# Patient Record
Sex: FEMALE | Race: ASIAN | NOT HISPANIC OR LATINO
[De-identification: names, ages, dates, MRNs, and addresses within clinical notes are randomized per-mention and may not be internally consistent; named-entity substitution may affect disease eponyms.]

---

## 2020-08-18 PROBLEM — Z00.00 ENCOUNTER FOR PREVENTIVE HEALTH EXAMINATION: Status: ACTIVE | Noted: 2020-08-18

## 2020-08-19 ENCOUNTER — APPOINTMENT (OUTPATIENT)
Dept: OTOLARYNGOLOGY | Facility: CLINIC | Age: 65
End: 2020-08-19
Payer: COMMERCIAL

## 2020-08-19 VITALS — BODY MASS INDEX: 25.61 KG/M2 | WEIGHT: 150 LBS | HEIGHT: 64 IN

## 2020-08-19 VITALS
OXYGEN SATURATION: 98 % | TEMPERATURE: 98.1 F | SYSTOLIC BLOOD PRESSURE: 137 MMHG | DIASTOLIC BLOOD PRESSURE: 85 MMHG | HEART RATE: 65 BPM

## 2020-08-19 DIAGNOSIS — Z82.49 FAMILY HISTORY OF ISCHEMIC HEART DISEASE AND OTHER DISEASES OF THE CIRCULATORY SYSTEM: ICD-10-CM

## 2020-08-19 DIAGNOSIS — H66.90 OTITIS MEDIA, UNSPECIFIED, UNSPECIFIED EAR: ICD-10-CM

## 2020-08-19 DIAGNOSIS — Z87.09 PERSONAL HISTORY OF OTHER DISEASES OF THE RESPIRATORY SYSTEM: ICD-10-CM

## 2020-08-19 DIAGNOSIS — Z86.79 PERSONAL HISTORY OF OTHER DISEASES OF THE CIRCULATORY SYSTEM: ICD-10-CM

## 2020-08-19 DIAGNOSIS — Z80.9 FAMILY HISTORY OF MALIGNANT NEOPLASM, UNSPECIFIED: ICD-10-CM

## 2020-08-19 DIAGNOSIS — Z82.2 FAMILY HISTORY OF DEAFNESS AND HEARING LOSS: ICD-10-CM

## 2020-08-19 PROCEDURE — 99204 OFFICE O/P NEW MOD 45 MIN: CPT

## 2020-08-19 RX ORDER — AMOXICILLIN AND CLAVULANATE POTASSIUM 875; 125 MG/1; MG/1
875-125 TABLET, COATED ORAL
Qty: 20 | Refills: 0 | Status: ACTIVE | COMMUNITY
Start: 2020-08-19 | End: 1900-01-01

## 2020-08-19 RX ORDER — AMLODIPINE BESYLATE 5 MG/1
5 TABLET ORAL
Refills: 0 | Status: ACTIVE | COMMUNITY

## 2020-08-19 RX ORDER — AMOXICILLIN 500 MG/1
500 TABLET, FILM COATED ORAL
Refills: 0 | Status: ACTIVE | COMMUNITY

## 2020-08-19 RX ORDER — OFLOXACIN OTIC 3 MG/ML
0.3 SOLUTION AURICULAR (OTIC)
Qty: 1 | Refills: 1 | Status: ACTIVE | COMMUNITY
Start: 2020-08-19 | End: 1900-01-01

## 2020-08-19 NOTE — PHYSICAL EXAM
[Midline] : trachea located in midline position [Normal] : orientation to person, place, and time: normal [de-identified] : r purulence suctioned scant; l ok

## 2020-08-19 NOTE — HISTORY OF PRESENT ILLNESS
[de-identified] : 66 yo woman had r tympmastoid for cholsteatoma 1995 - and had subsequent pe tube with Dr Bustamante gets fluid on occ in ears, sometimes pe tube blocked. Managed by Dr Gutierrez more recently. Sunday night - she noticed fullness and fluid and got thick drainage from her r eac with qtip - tried drops sun nite mon took amox 500 qid - controls the pain./has some dizziness with ha. sx are moderate-severe. Denies fh relevant to cc. nonsmoker

## 2020-09-03 ENCOUNTER — APPOINTMENT (OUTPATIENT)
Dept: OTOLARYNGOLOGY | Facility: CLINIC | Age: 65
End: 2020-09-03
Payer: COMMERCIAL

## 2020-09-03 VITALS
DIASTOLIC BLOOD PRESSURE: 81 MMHG | SYSTOLIC BLOOD PRESSURE: 143 MMHG | OXYGEN SATURATION: 96 % | TEMPERATURE: 98.3 F | HEART RATE: 78 BPM

## 2020-09-03 PROCEDURE — 99213 OFFICE O/P EST LOW 20 MIN: CPT

## 2020-09-03 NOTE — PHYSICAL EXAM
[Midline] : trachea located in midline position [Normal] : no rashes [de-identified] : no purulent drainage today eacs normal [de-identified] : L TM attic retraction; r t-tube open no more drainage.

## 2020-09-03 NOTE — HISTORY OF PRESENT ILLNESS
[de-identified] : 65F 2 week followup of r purulent drainage thru pe tube - took abx and drops as prescribed - augmentin and floxin - and notes that the drainage is much better but she still has some postauricular fullness. no pain. -f.s.c no vertigo. has h/o tymp mastoid for cholesteatoma by Dr Bustamante 25 yrs ago. nonsmoker. protests her ear from water.

## 2020-09-03 NOTE — ASSESSMENT
[FreeTextEntry1] : 65F w pmh of R tympanomastoid, here for follow up of R OM. Now with resolved purulent drainage, but persistent symptoms of postauricular fullness. \par \par Plan:\par - CT temporal bone- r/o cholesteatoma\par -  \par - rtc with above\par -medical records requested from [facility]

## 2020-09-05 ENCOUNTER — TRANSCRIPTION ENCOUNTER (OUTPATIENT)
Age: 65
End: 2020-09-05

## 2020-09-17 ENCOUNTER — APPOINTMENT (OUTPATIENT)
Dept: OTOLARYNGOLOGY | Facility: CLINIC | Age: 65
End: 2020-09-17
Payer: COMMERCIAL

## 2020-09-17 DIAGNOSIS — H91.92 UNSPECIFIED HEARING LOSS, LEFT EAR: ICD-10-CM

## 2020-09-17 PROCEDURE — 92557 COMPREHENSIVE HEARING TEST: CPT

## 2020-09-17 PROCEDURE — 99214 OFFICE O/P EST MOD 30 MIN: CPT

## 2020-09-17 PROCEDURE — 92550 TYMPANOMETRY & REFLEX THRESH: CPT

## 2020-09-17 NOTE — DATA REVIEWED
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

## 2020-09-17 NOTE — PHYSICAL EXAM
[de-identified] : R TM with retraction pocket and evidence of cholesteatoma seen on otomicroscopy [Normal] : no rashes

## 2020-09-17 NOTE — HISTORY OF PRESENT ILLNESS
[de-identified] : 65F w PMH of R cholesteatoma s/p resection 25 yrs ago, who returns for follow up of decreased hearing. She reports her left hearing has been down for some time in comparison to the right. She admits to some tinnitus, but denies any otalgia, otorrhea (recently resolved) and vertigo. she feels her l hl is severe and r is mild denies vertigo. had ct and is here to review.\par \par No other ENT complaints.

## 2020-09-17 NOTE — ASSESSMENT
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 Ct temp shows suspicion for cholesteatoma recurrence.\par \par Plan:\par - recommended R revision tympanomastoidectomy for resection of cholesteatoma- risks benefits and alts discussed. risks of not doing operation also discussed, incl meningitis and facial paralysis and further hl. Despite this, patient declining surgery at this time- PA was also present. she asked if she could wait a few yrs and I explained I do not recommend this and it can be extremely dangerous.\par she will zack if interested\par always invited to followup \par does not want names for second opinions\par asst asked to contact Dr Bustamante's office for old records.\par -

## 2020-12-23 PROBLEM — H66.90 EAR INFECTION: Status: RESOLVED | Noted: 2020-08-19 | Resolved: 2020-12-23

## 2021-02-23 ENCOUNTER — APPOINTMENT (OUTPATIENT)
Dept: OTOLARYNGOLOGY | Facility: CLINIC | Age: 66
End: 2021-02-23
Payer: COMMERCIAL

## 2021-02-23 VITALS
OXYGEN SATURATION: 96 % | SYSTOLIC BLOOD PRESSURE: 133 MMHG | DIASTOLIC BLOOD PRESSURE: 82 MMHG | TEMPERATURE: 98.1 F | HEART RATE: 72 BPM

## 2021-02-23 DIAGNOSIS — H70.11 CHRONIC MASTOIDITIS, RIGHT EAR: ICD-10-CM

## 2021-02-23 PROCEDURE — 99072 ADDL SUPL MATRL&STAF TM PHE: CPT

## 2021-02-23 PROCEDURE — 31231 NASAL ENDOSCOPY DX: CPT

## 2021-02-23 PROCEDURE — 99214 OFFICE O/P EST MOD 30 MIN: CPT | Mod: 25

## 2021-02-23 RX ORDER — FLUTICASONE PROPIONATE 50 UG/1
50 SPRAY, METERED NASAL DAILY
Qty: 1 | Refills: 2 | Status: ACTIVE | COMMUNITY
Start: 2021-02-23 | End: 1900-01-01

## 2021-02-23 RX ORDER — OFLOXACIN OTIC 3 MG/ML
0.3 SOLUTION AURICULAR (OTIC)
Qty: 1 | Refills: 1 | Status: ACTIVE | COMMUNITY
Start: 2021-02-23 | End: 1900-01-01

## 2021-02-23 RX ORDER — AMOXICILLIN AND CLAVULANATE POTASSIUM 875; 125 MG/1; MG/1
875-125 TABLET, COATED ORAL
Qty: 20 | Refills: 0 | Status: ACTIVE | COMMUNITY
Start: 2021-02-23 | End: 1900-01-01

## 2021-02-23 NOTE — REASON FOR VISIT
[Subsequent Evaluation] : a subsequent evaluation for [FreeTextEntry2] : nasal congestion and left ear popping

## 2021-02-23 NOTE — HISTORY OF PRESENT ILLNESS
[de-identified] : followup 64 yo woman with r pe tube and exam c/w recurrent cholesteatoma will not allow surgery came in today with 2 weeks of r ear popping and nasal congestion and drip . she does ha ve h/o inhalant allergy. she also gets headache. -f.s.c nonsmoker. no pets.

## 2021-02-23 NOTE — ASSESSMENT
[FreeTextEntry1] : ar, nasopharyngitis\par flonase, allegra, augmentin\par \par debris stuck on r pe tube\par floxin\par rtc mon to suction it off or replace if still adherent

## 2021-02-23 NOTE — PROCEDURE
[Posterior Lesion] : posterior lesion [Anterior rhinoscopy insufficient to account for symptoms] : anterior rhinoscopy insufficient to account for symptoms [Flexible Endoscope] : examined with the flexible endoscope [Serial Number: ___] : Serial Number: [unfilled] [Allergic] : allergic signs [Normal] : the paranasal sinuses had no abnormalities [FreeTextEntry6] : omus indurated\par npx indurated and has dry yellow matl no masses

## 2021-02-23 NOTE — PHYSICAL EXAM
[de-identified] : crust around r pe tube adherent, could not remove without jeopardizing tube; l ok [Nasal Endoscopy Performed] : nasal endoscopy was performed, see procedure section for findings [de-identified] : engorged [Normal] : no rashes [de-identified] : gait steady

## 2021-03-01 ENCOUNTER — APPOINTMENT (OUTPATIENT)
Dept: OTOLARYNGOLOGY | Facility: CLINIC | Age: 66
End: 2021-03-01
Payer: COMMERCIAL

## 2021-03-01 VITALS
HEART RATE: 69 BPM | DIASTOLIC BLOOD PRESSURE: 82 MMHG | SYSTOLIC BLOOD PRESSURE: 135 MMHG | TEMPERATURE: 98.3 F | OXYGEN SATURATION: 96 %

## 2021-03-01 DIAGNOSIS — J31.1 CHRONIC NASOPHARYNGITIS: ICD-10-CM

## 2021-03-01 DIAGNOSIS — H93.8X1 OTHER SPECIFIED DISORDERS OF RIGHT EAR: ICD-10-CM

## 2021-03-01 PROCEDURE — 99213 OFFICE O/P EST LOW 20 MIN: CPT

## 2021-03-01 PROCEDURE — 99072 ADDL SUPL MATRL&STAF TM PHE: CPT

## 2021-03-01 NOTE — HISTORY OF PRESENT ILLNESS
[de-identified] : 65F who has recurrent cholesteatoma with R PE tube. On last exAm she was found to have nasopharyngitis and was prescribed Flonase and Augmentin and then Ofloxacin for crusting overlying the PE tube. She reports some improvement of her ear blocking, but continued popping. No new ENT complaints. her nasopharyngeal sx have completely resolved after taking abx.

## 2021-03-01 NOTE — ASSESSMENT
[FreeTextEntry1] : 65F who returns for nasopharyngitis and R ear popping with PE tube obstruction. Cerumen removed from the PE tube with immediate improvement of symptoms. \par \par Plan:\par - f/u in 3 months\par - continue use of Flonase PRN

## 2021-03-01 NOTE — PHYSICAL EXAM
[Midline] : trachea located in midline position [Normal] : no rashes [de-identified] : R PE tube encrusted with cerumen, removed

## 2021-03-01 NOTE — PROCEDURE
[Risk and Benefits Discussed] : The purpose, risks, discomforts, benefits and alternatives of the procedure have been explained to the patient including no treatment. [Cerumen Impaction] : Cerumen Impaction [Same] : same as the Pre Op Dx. [] : Removal of Cerumen [FreeTextEntry1] : see subjective [FreeTextEntry2] : right [FreeTextEntry6] : encrusted cerumen overlying the R PE tube removed and PE tube cleared

## 2021-05-05 ENCOUNTER — APPOINTMENT (OUTPATIENT)
Dept: OTOLARYNGOLOGY | Facility: CLINIC | Age: 66
End: 2021-05-05
Payer: COMMERCIAL

## 2021-05-05 ENCOUNTER — APPOINTMENT (OUTPATIENT)
Dept: OTOLARYNGOLOGY | Facility: CLINIC | Age: 66
End: 2021-05-05

## 2021-05-05 VITALS
TEMPERATURE: 98 F | DIASTOLIC BLOOD PRESSURE: 80 MMHG | HEART RATE: 66 BPM | SYSTOLIC BLOOD PRESSURE: 132 MMHG | OXYGEN SATURATION: 97 %

## 2021-05-05 DIAGNOSIS — J32.2 CHRONIC ETHMOIDAL SINUSITIS: ICD-10-CM

## 2021-05-05 DIAGNOSIS — R51.9 HEADACHE, UNSPECIFIED: ICD-10-CM

## 2021-05-05 DIAGNOSIS — H71.01 CHOLESTEATOMA OF ATTIC, RIGHT EAR: ICD-10-CM

## 2021-05-05 DIAGNOSIS — J30.1 ALLERGIC RHINITIS DUE TO POLLEN: ICD-10-CM

## 2021-05-05 DIAGNOSIS — H90.A12 CONDUCTIVE HEARING LOSS, UNILATERAL, LEFT EAR WITH RESTRICTED HEARING ON THE CONTRALATERAL SIDE: ICD-10-CM

## 2021-05-05 PROCEDURE — 99214 OFFICE O/P EST MOD 30 MIN: CPT | Mod: 25

## 2021-05-05 PROCEDURE — 99072 ADDL SUPL MATRL&STAF TM PHE: CPT

## 2021-05-05 PROCEDURE — 31231 NASAL ENDOSCOPY DX: CPT

## 2021-05-05 RX ORDER — MOMETASONE 50 UG/1
50 SPRAY, METERED NASAL
Qty: 1 | Refills: 5 | Status: ACTIVE | COMMUNITY
Start: 2021-05-05 | End: 1900-01-01

## 2021-05-05 RX ORDER — AZITHROMYCIN 250 MG/1
250 TABLET, FILM COATED ORAL
Qty: 1 | Refills: 0 | Status: ACTIVE | COMMUNITY
Start: 2021-05-05 | End: 1900-01-01

## 2021-05-05 NOTE — PROCEDURE
[Osteomeatal Pathology] : osteomeatal pathology [Posterior Lesion] : posterior lesion [Anterior rhinoscopy insufficient to account for symptoms] : anterior rhinoscopy insufficient to account for symptoms [Flexible Endoscope] : examined with the flexible endoscope [Allergic] : allergic signs [Paranasal Sinuses Middle Meatus] : bilateral purulence [Normal] : the paranasal sinuses had no abnormalities [Serial Number: ___] : Serial Number: [unfilled] [FreeTextEntry6] : engorged mucosa\par mucus drainage into pharynx - yellow form omus [FreeTextEntry4] : mild

## 2021-05-05 NOTE — ASSESSMENT
[FreeTextEntry1] : ar,  early sinusitis\par nasonex (flonase makes her nose bleed)\par can try allegra\par cannot take decongestants due to bp\par zpack (confirmed on phone ok with her)\par rtc 1 mo

## 2021-05-05 NOTE — REASON FOR VISIT
[Subsequent Evaluation] : a subsequent evaluation for [FreeTextEntry2] : nasal congestion and drainage, facial pressure

## 2021-05-05 NOTE — HISTORY OF PRESENT ILLNESS
[de-identified] : followup 66 yo woman with r recurrent cholesteatoma of middle ear and mastoid and r pe tube has had sinonasal congestion, allergic sx, pressure on face, radiates to neck with discolored mucus for 2 weeks. -f.s.c No inciting event. no pets.

## 2021-05-05 NOTE — PHYSICAL EXAM
[Normal] : no rashes [de-identified] : l ok r pe tube open and in place [de-identified] : engorged couldn’t see back of nose [de-identified] : gait steady

## 2021-11-11 ENCOUNTER — NON-APPOINTMENT (OUTPATIENT)
Age: 66
End: 2021-11-11

## 2025-07-07 ENCOUNTER — NON-APPOINTMENT (OUTPATIENT)
Age: 70
End: 2025-07-07

## 2025-07-08 ENCOUNTER — NON-APPOINTMENT (OUTPATIENT)
Age: 70
End: 2025-07-08

## 2025-07-09 ENCOUNTER — APPOINTMENT (OUTPATIENT)
Dept: OTOLARYNGOLOGY | Facility: CLINIC | Age: 70
End: 2025-07-09
Payer: MEDICARE

## 2025-07-09 ENCOUNTER — EMERGENCY (EMERGENCY)
Facility: HOSPITAL | Age: 70
LOS: 1 days | End: 2025-07-09
Attending: STUDENT IN AN ORGANIZED HEALTH CARE EDUCATION/TRAINING PROGRAM | Admitting: STUDENT IN AN ORGANIZED HEALTH CARE EDUCATION/TRAINING PROGRAM
Payer: MEDICARE

## 2025-07-09 VITALS
SYSTOLIC BLOOD PRESSURE: 135 MMHG | HEART RATE: 70 BPM | DIASTOLIC BLOOD PRESSURE: 82 MMHG | OXYGEN SATURATION: 98 % | RESPIRATION RATE: 17 BRPM | TEMPERATURE: 98 F

## 2025-07-09 VITALS
OXYGEN SATURATION: 98 % | HEIGHT: 64 IN | BODY MASS INDEX: 25.61 KG/M2 | TEMPERATURE: 98 F | DIASTOLIC BLOOD PRESSURE: 80 MMHG | WEIGHT: 150 LBS | SYSTOLIC BLOOD PRESSURE: 137 MMHG | HEART RATE: 70 BPM

## 2025-07-09 VITALS
HEART RATE: 78 BPM | SYSTOLIC BLOOD PRESSURE: 137 MMHG | DIASTOLIC BLOOD PRESSURE: 79 MMHG | TEMPERATURE: 98 F | HEIGHT: 67 IN | OXYGEN SATURATION: 99 % | RESPIRATION RATE: 16 BRPM | WEIGHT: 130.95 LBS

## 2025-07-09 VITALS — DIASTOLIC BLOOD PRESSURE: 88 MMHG | SYSTOLIC BLOOD PRESSURE: 149 MMHG

## 2025-07-09 PROBLEM — H90.6 HEARING LOSS, MIXED, BILATERAL: Status: ACTIVE | Noted: 2025-07-09

## 2025-07-09 PROBLEM — H72.01 CENTRAL PERFORATION OF TYMPANIC MEMBRANE OF RIGHT EAR: Status: ACTIVE | Noted: 2025-07-09

## 2025-07-09 PROCEDURE — 99203 OFFICE O/P NEW LOW 30 MIN: CPT

## 2025-07-09 PROCEDURE — 92567 TYMPANOMETRY: CPT

## 2025-07-09 PROCEDURE — 72125 CT NECK SPINE W/O DYE: CPT | Mod: 26

## 2025-07-09 PROCEDURE — 70450 CT HEAD/BRAIN W/O DYE: CPT | Mod: 26

## 2025-07-09 PROCEDURE — 92552 PURE TONE AUDIOMETRY AIR: CPT

## 2025-07-09 PROCEDURE — 99284 EMERGENCY DEPT VISIT MOD MDM: CPT | Mod: 25

## 2025-07-09 PROCEDURE — 72125 CT NECK SPINE W/O DYE: CPT

## 2025-07-09 PROCEDURE — 70450 CT HEAD/BRAIN W/O DYE: CPT

## 2025-07-09 PROCEDURE — 99284 EMERGENCY DEPT VISIT MOD MDM: CPT

## 2025-07-09 RX ORDER — IBUPROFEN 200 MG
600 TABLET ORAL ONCE
Refills: 0 | Status: COMPLETED | OUTPATIENT
Start: 2025-07-09 | End: 2025-07-09

## 2025-07-09 RX ORDER — ACETAMINOPHEN 500 MG/5ML
1000 LIQUID (ML) ORAL ONCE
Refills: 0 | Status: COMPLETED | OUTPATIENT
Start: 2025-07-09 | End: 2025-07-09

## 2025-07-09 RX ORDER — ROSUVASTATIN CALCIUM 5 MG/1
TABLET, FILM COATED ORAL
Refills: 0 | Status: ACTIVE | COMMUNITY

## 2025-07-09 RX ADMIN — Medication 1000 MILLIGRAM(S): at 16:07

## 2025-07-09 RX ADMIN — Medication 600 MILLIGRAM(S): at 16:07

## 2025-07-11 DIAGNOSIS — S09.90XA UNSPECIFIED INJURY OF HEAD, INITIAL ENCOUNTER: ICD-10-CM

## 2025-07-11 DIAGNOSIS — M25.512 PAIN IN LEFT SHOULDER: ICD-10-CM

## 2025-07-11 DIAGNOSIS — I10 ESSENTIAL (PRIMARY) HYPERTENSION: ICD-10-CM
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
## 2025-07-11 DIAGNOSIS — E78.5 HYPERLIPIDEMIA, UNSPECIFIED: ICD-10-CM

## 2025-07-11 DIAGNOSIS — S43.402A UNSPECIFIED SPRAIN OF LEFT SHOULDER JOINT, INITIAL ENCOUNTER: ICD-10-CM
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
## 2025-07-25 ENCOUNTER — APPOINTMENT (OUTPATIENT)
Dept: OTOLARYNGOLOGY | Facility: CLINIC | Age: 70
End: 2025-07-25
Payer: MEDICARE

## 2025-07-25 DIAGNOSIS — H90.0 CONDUCTIVE HEARING LOSS, BILATERAL: ICD-10-CM

## 2025-07-25 PROCEDURE — 99213 OFFICE O/P EST LOW 20 MIN: CPT

## 2025-07-25 PROCEDURE — 92567 TYMPANOMETRY: CPT

## 2025-07-25 PROCEDURE — 92557 COMPREHENSIVE HEARING TEST: CPT

## 2025-07-28 ENCOUNTER — NON-APPOINTMENT (OUTPATIENT)
Age: 70
End: 2025-07-28

## 2025-08-05 ENCOUNTER — APPOINTMENT (OUTPATIENT)
Dept: OTOLARYNGOLOGY | Facility: CLINIC | Age: 70
End: 2025-08-05

## 2025-08-05 VITALS
BODY MASS INDEX: 25.61 KG/M2 | SYSTOLIC BLOOD PRESSURE: 128 MMHG | WEIGHT: 150 LBS | DIASTOLIC BLOOD PRESSURE: 78 MMHG | HEART RATE: 67 BPM | OXYGEN SATURATION: 96 % | HEIGHT: 64 IN | TEMPERATURE: 97.8 F

## 2025-08-05 DIAGNOSIS — G44.59 OTHER COMPLICATED HEADACHE SYNDROME: ICD-10-CM

## 2025-08-05 DIAGNOSIS — R68.89 OTHER GENERAL SYMPTOMS AND SIGNS: ICD-10-CM

## 2025-08-05 DIAGNOSIS — R51.9 HEADACHE, UNSPECIFIED: ICD-10-CM

## 2025-08-05 DIAGNOSIS — H70.11 CHRONIC MASTOIDITIS, RIGHT EAR: ICD-10-CM

## 2025-08-05 DIAGNOSIS — M25.519 PAIN IN UNSPECIFIED SHOULDER: ICD-10-CM

## 2025-08-05 PROCEDURE — 99213 OFFICE O/P EST LOW 20 MIN: CPT

## 2025-08-05 RX ORDER — FLUTICASONE PROPIONATE 50 UG/1
50 SPRAY NASAL
Qty: 1 | Refills: 0 | Status: ACTIVE | COMMUNITY
Start: 2025-08-05 | End: 1900-01-01

## 2025-08-18 ENCOUNTER — NON-APPOINTMENT (OUTPATIENT)
Age: 70
End: 2025-08-18

## 2025-08-18 ENCOUNTER — APPOINTMENT (OUTPATIENT)
Dept: NEUROLOGY | Facility: CLINIC | Age: 70
End: 2025-08-18
Payer: MEDICARE

## 2025-08-18 VITALS
SYSTOLIC BLOOD PRESSURE: 125 MMHG | OXYGEN SATURATION: 96 % | DIASTOLIC BLOOD PRESSURE: 78 MMHG | WEIGHT: 153 LBS | HEART RATE: 70 BPM | HEIGHT: 64 IN | BODY MASS INDEX: 26.12 KG/M2 | TEMPERATURE: 97.3 F

## 2025-08-18 DIAGNOSIS — M43.6 TORTICOLLIS: ICD-10-CM

## 2025-08-18 DIAGNOSIS — G44.209 TENSION-TYPE HEADACHE, UNSPECIFIED, NOT INTRACTABLE: ICD-10-CM

## 2025-08-18 PROCEDURE — 99204 OFFICE O/P NEW MOD 45 MIN: CPT
